# Patient Record
(demographics unavailable — no encounter records)

---

## 2025-01-23 NOTE — PHYSICAL EXAM
[Respiratory Effort] : normal respiratory effort [Normal Heart Sounds] : normal heart sounds [Alert] : alert [Calm] : calm [2+] : left 2+ [Ankle Swelling (On Exam)] : not present [Varicose Veins Of Lower Extremities] : not present [] : not present [Abdomen Tenderness] : ~T ~M No abdominal tenderness [de-identified] : WN/WD, NAD [de-identified] : NC/AT [de-identified] : supple [de-identified] : FROM [de-identified] : grossly intact

## 2025-01-23 NOTE — REVIEW OF SYSTEMS
[Limb Swelling] : limb swelling [Negative] : Heme/Lymph [As Noted in HPI] : as noted in HPI [Limb Pain] : no limb pain

## 2025-01-23 NOTE — PHYSICAL EXAM
[Respiratory Effort] : normal respiratory effort [Normal Heart Sounds] : normal heart sounds [Alert] : alert [Calm] : calm [2+] : left 2+ [Ankle Swelling (On Exam)] : not present [Varicose Veins Of Lower Extremities] : not present [] : not present [Abdomen Tenderness] : ~T ~M No abdominal tenderness [de-identified] : WN/WD, NAD [de-identified] : NC/AT [de-identified] : supple [de-identified] : FROM [de-identified] : grossly intact

## 2025-01-23 NOTE — ADDENDUM
[FreeTextEntry1] : This note was written by Wen GARIBAY, acting as a scribe for Dr. Coleman Toscano.  I, Dr. Coleman Toscano, have read and attest that all the information, medical decision-making, and discharge instructions within are true and accurate.  I, Dr. Coleman Toscano, personally performed the evaluation and management (E/M) services for this new patient.  That E/M includes conducting the initial examination, assessing all conditions, and establishing the plan of care.  Today, my ACP, Wen GARIBAY, was here to observe my evaluation and management services for this patient to be followed going forward.  I spent a total of 40 minutes in this encounter.

## 2025-01-23 NOTE — HISTORY OF PRESENT ILLNESS
[FreeTextEntry1] : 77yoF with HTN, hypothyroidism who is referred by her podiatrist, Dr. Anjana Polanco to be evaluated for LEs edema. Patient states that her podiatrist noticed her legs were swollen every time she had an appointment. Patient hasn't noticed it herself, since she hasn't experienced any discomfort or difficulties wearing shoes. Once her podiatrist mentioned it to her, she started to do research and realized thet her swelling is might be due Amlodipine, that she has been taking for few years. She stopped it and noticed that her swelling has subsided. She denies hx of varicose veins, DVT, SVT, claudication, rest pain, skin changes.

## 2025-01-23 NOTE — ASSESSMENT
[FreeTextEntry1] : 77yoF with HTN, hypothyroidism who is referred by her podiatrist, Dr. Anjana Polanco to be evaluated for LEs edema. Patient denies LE pain, hx of varicose veins/DVT. She was on Amlodipine and discontinued it Her swelling has improved. On exam, both legs are well perfused, no evidence of edema, skin discoloration. Palpable peripheral pulses. Venous doppler was done in the office that demonstrated no evidence of DVT/SVT, no reflux. We discussed the findings and explained that no vascular issues at this time. She may f/u as needed.

## 2025-01-23 NOTE — PROCEDURE
[FreeTextEntry1] : Venous doppler was done in the office that demonstrated no evidence of DVT/SVT, no reflux

## 2025-02-24 NOTE — PROCEDURE
[de-identified] : INJECTION RIGHT AND LEFT SHOULDER GH JOINT   Patient has demonstrated limited relief from NSAIDS, rest, exercises / PT, and after discussion of the risks and benefits, the patient has elected to proceed with an ULTRASOUND GUIDED injection into the RIGHT AND LEFT  GLENOHUMERAL JOINT - POSTERIOR APPROACH    Confirmed that the patient does not have history of prior adverse reactions, active, infections, or relevant allergies. There was no effusion, erythema, or warmth, and the skin was clear  The skin was sterilized with alcohol. Ethyl Chloride was used as a topical anesthetic. Routine sterile technique.  The site was injected UTILIZING ULTRASOUND GUIDANCE to confirm appropriate placement of the needle- with a mixture of medication and local anesthetic. The injection was completed without complication and a bandage was applied.   The patient tolerated the procedure well and was given post-injection instructions.Rec: Cold therapy, analgesics, avoid heavy activity. MEDICATION: 4cc of 1% xylocaine + 10mg of Kenalog LOT# [id number] EXP MARCH 2026

## 2025-02-24 NOTE — PHYSICAL EXAM
[de-identified] : PHYSICAL EXAM LEFT AND RIGHT   SHOULDER  NORMAL POSTURE / SCAPULAR PROTRACTION AROM  LEFT  140 / 140 / 75 / 10 RIGHT   150 / 140 / 80 / 10 TENDER:  GH JOINT POSTERIOR   SPECIAL TESTING : NAYAK - POSITIVE  JASE - POSITIVE  SPEED TEST - POSITIVE  HOWARD - NEGATIVE  APPREHENSION AND SUPPRESSION - NEGATIVE   RC STRENGTH TESTING  SS:  5/5 SUB 5/5 IS     5/5 BICEPS  5/5  SENSATION  - GROSSLY INTACT    [de-identified] : LEFT SHOULDER XRAY (2 VIEWS - AP AND OUTLET) -   NO OBVIOUS FRACTURE , SEPARATION OR DISLOCATION  GRADE 2 GH OSTEOARTHRITIS  TYPE 2B ACROMION     RIGHT SHOULDER XRAY (2 VIEWS - AP AND OUTLET) -   NO OBVIOUS FRACTURE ,  SEPARATION OR DISLOCATION  GRADE 3 GH OSTEOARTHRITIS  TYPE 2B ACROMION

## 2025-02-24 NOTE — DISCUSSION/SUMMARY
[de-identified] : SHOULDER GH OSTEOARTHRITIS   PATIENT HAS ELECTED TO PROCEED WITH KENALOG INJECTION SHOULDER RISKS AND BENEFITS DISCUSSED - VERBAL CONSENT OBTAINED SEE PROCEDURE NOTE     POST INJECTION INSTRUCTIONS:   INJECTION THERAPY HANDOUT PROVIDED   COLD THERAPY , TYLENOL  PRN   HOME  EXERCISES QD -  PENDULUM AND ROM  HANDOUT PROVIDED, REVIEWED AND DEMONSTRATED - REFERRED TO INSTRUCTIONAL VIDEO ON MY WEBSITE    MONOVISC ORDERED

## 2025-02-24 NOTE — HISTORY OF PRESENT ILLNESS
[de-identified] : PATIENT PRESENTS TODAY WITH BILATERAL SHOULDER (R>L)- RHD  PAIN BEGAN 6 MONTHS AGO - NO SPECIFIC INJURY RADIATES UP NECK IS YOUR PROBLEM GETTING WORSE? YES INTERMITTENT PAIN   5-6 /10 DEEP IN SHOUDLER DESCRIPTION OF PAIN: SHARP, ACHY, THROBBING PRIOR/CURRENT TREATMENTS: RESTING, TYLENOL, ADVIL  WORSE AT PARTICULAR TIME OF DAY: WORSE AT NIGHT, DURING THE DAY, SLEEPING AGGRAVATING FACTORS: GRABING, PICKING UP, OVER HEADLIFTING, REACHING BEHIND,   HAS HAD PHYSICAL THERAPY WITHOUT RELIEF- NO HAS HAD PREVIOUS SURGERY- NO HAS HAD PREVIOUS INJECTION - NO HAS HAD PREVIOUS IMAGING - NO

## 2025-03-24 NOTE — DISCUSSION/SUMMARY
[de-identified] :  PATIENT HAS ELECTED TO PROCEED WITH MONOVISC INJECTION SHOULDER RISKS AND BENEFITS DISCUSSED - VERBAL CONSENT OBTAINED SEE PROCEDURE NOTE     POST INJECTION INSTRUCTIONS:   INJECTION THERAPY HANDOUT PROVIDED   COLD THERAPY , ANALGESICS PRN   HOME  EXERCISES QD -    REPEAT MONOVISC 6 MONTHS

## 2025-03-24 NOTE — HISTORY OF PRESENT ILLNESS
[de-identified] : PATIENT IS HERE FOR TODAY FOR  RIGHT SHOULDER/ LEFT SHOULDER FOLLOW UP GEL INJECTION TODAY       PREVIOUS HPI-PATIENT PRESENTS TODAY WITH BILATERAL SHOULDER (R>L)- RHD  PAIN BEGAN 6 MONTHS AGO - NO SPECIFIC INJURY RADIATES UP NECK IS YOUR PROBLEM GETTING WORSE? YES INTERMITTENT PAIN   5-6 /10 DEEP IN SHOUDLER DESCRIPTION OF PAIN: SHARP, ACHY, THROBBING PRIOR/CURRENT TREATMENTS: RESTING, TYLENOL, ADVIL  WORSE AT PARTICULAR TIME OF DAY: WORSE AT NIGHT, DURING THE DAY, SLEEPING AGGRAVATING FACTORS: GRABING, PICKING UP, OVER HEADLIFTING, REACHING BEHIND,   HAS HAD PHYSICAL THERAPY WITHOUT RELIEF- NO HAS HAD PREVIOUS SURGERY- NO HAS HAD PREVIOUS INJECTION - NO HAS HAD PREVIOUS IMAGING - NO

## 2025-03-24 NOTE — PROCEDURE
[de-identified] : INJECTION VISCOSUPPLEMENT RIGHT AND LEFT   SHOULDER GH JOINT  Patient has demonstrated limited relief from NSAIDS, rest, exercises / PT, and after discussion of the risks and benefits, the patient has elected to proceed with an ULTRASOUND GUIDED injection into the RIGHT AND LEFT SHOULDER GH JOINT    Confirmed that the patient does not have history of prior adverse reactions, active, infections, or relevant allergies. There was no effusion, erythema, or warmth, and the skin was clear  The skin was sterilized with alcohol. Ethyl Chloride was used as a topical anesthetic. Routine sterile technique.  The site was injected UTILIZING ULTRASOUND GUIDANCE to confirm appropriate placement of the needle- with VISCOSUPPLEMENT. The injection was completed without complication and a bandage was applied.   The patient tolerated the procedure well and was given post-injection instructions.Rec: Cold therapy, analgesics, avoid heavy activity. MEDICATION: MONOVISC 4cc BECKY EDWARDS

## 2025-03-24 NOTE — DISCUSSION/SUMMARY
[de-identified] :  PATIENT HAS ELECTED TO PROCEED WITH MONOVISC INJECTION SHOULDER RISKS AND BENEFITS DISCUSSED - VERBAL CONSENT OBTAINED SEE PROCEDURE NOTE     POST INJECTION INSTRUCTIONS:   INJECTION THERAPY HANDOUT PROVIDED   COLD THERAPY , ANALGESICS PRN   HOME  EXERCISES QD -    REPEAT MONOVISC 6 MONTHS

## 2025-03-24 NOTE — HISTORY OF PRESENT ILLNESS
[de-identified] : PATIENT IS HERE FOR TODAY FOR  RIGHT SHOULDER/ LEFT SHOULDER FOLLOW UP GEL INJECTION TODAY       PREVIOUS HPI-PATIENT PRESENTS TODAY WITH BILATERAL SHOULDER (R>L)- RHD  PAIN BEGAN 6 MONTHS AGO - NO SPECIFIC INJURY RADIATES UP NECK IS YOUR PROBLEM GETTING WORSE? YES INTERMITTENT PAIN   5-6 /10 DEEP IN SHOUDLER DESCRIPTION OF PAIN: SHARP, ACHY, THROBBING PRIOR/CURRENT TREATMENTS: RESTING, TYLENOL, ADVIL  WORSE AT PARTICULAR TIME OF DAY: WORSE AT NIGHT, DURING THE DAY, SLEEPING AGGRAVATING FACTORS: GRABING, PICKING UP, OVER HEADLIFTING, REACHING BEHIND,   HAS HAD PHYSICAL THERAPY WITHOUT RELIEF- NO HAS HAD PREVIOUS SURGERY- NO HAS HAD PREVIOUS INJECTION - NO HAS HAD PREVIOUS IMAGING - NO

## 2025-03-24 NOTE — PROCEDURE
[de-identified] : INJECTION VISCOSUPPLEMENT RIGHT AND LEFT   SHOULDER GH JOINT  Patient has demonstrated limited relief from NSAIDS, rest, exercises / PT, and after discussion of the risks and benefits, the patient has elected to proceed with an ULTRASOUND GUIDED injection into the RIGHT AND LEFT SHOULDER GH JOINT    Confirmed that the patient does not have history of prior adverse reactions, active, infections, or relevant allergies. There was no effusion, erythema, or warmth, and the skin was clear  The skin was sterilized with alcohol. Ethyl Chloride was used as a topical anesthetic. Routine sterile technique.  The site was injected UTILIZING ULTRASOUND GUIDANCE to confirm appropriate placement of the needle- with VISCOSUPPLEMENT. The injection was completed without complication and a bandage was applied.   The patient tolerated the procedure well and was given post-injection instructions.Rec: Cold therapy, analgesics, avoid heavy activity. MEDICATION: MONOVISC 4cc BECKY EDWARDS

## 2025-03-24 NOTE — PHYSICAL EXAM
[de-identified] : PHYSICAL EXAM LEFT AND RIGHT   SHOULDER  NORMAL POSTURE / SCAPULAR PROTRACTION AROM  LEFT  140 / 140 / 75 / 10 RIGHT   150 / 140 / 80 / 10 TENDER:  GH JOINT POSTERIOR   SPECIAL TESTING : NAYAK - POSITIVE  JASE - POSITIVE  SPEED TEST - POSITIVE  HOWARD - NEGATIVE  APPREHENSION AND SUPPRESSION - NEGATIVE   RC STRENGTH TESTING  SS:  5/5 SUB 5/5 IS     5/5 BICEPS  5/5  SENSATION  - GROSSLY INTACT

## 2025-03-24 NOTE — PHYSICAL EXAM
[de-identified] : PHYSICAL EXAM LEFT AND RIGHT   SHOULDER  NORMAL POSTURE / SCAPULAR PROTRACTION AROM  LEFT  140 / 140 / 75 / 10 RIGHT   150 / 140 / 80 / 10 TENDER:  GH JOINT POSTERIOR   SPECIAL TESTING : NAYAK - POSITIVE  JASE - POSITIVE  SPEED TEST - POSITIVE  HOWARD - NEGATIVE  APPREHENSION AND SUPPRESSION - NEGATIVE   RC STRENGTH TESTING  SS:  5/5 SUB 5/5 IS     5/5 BICEPS  5/5  SENSATION  - GROSSLY INTACT

## 2025-05-01 NOTE — ASSESSMENT
[FreeTextEntry1] : My impression is that the patient has significant right greater than left carpal tunnel syndrome.  She had an EMG done in 2019 demonstrating denervation as well as C5-C6 radiculopathy.  There is weakness of abductor pollicis brevis on the right.  I recommend she undergo right endoscopic versus open right carpal tunnel decompression  The risks benefits and alternatives were discussed with the patient including, but not limited to:   infection, nerve injury, pillar pain, CRPS, anesthetic block injury, persistent symptoms, scar sensitivity, development of a trigger digit, recurrence, incomplete release, etc.  The patient would like to proceed with surgery.  Shared decision-making was undertaken

## 2025-05-01 NOTE — PHYSICAL EXAM
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  She has 4-5 strength of her abductor pollicis brevis on the right.  Full strength of her abductor pollicis brevis on the left.  No palpable trigger digits.  Well-healed right thumb trigger finger [de-identified] : PA lateral oblique x-rays as well as carpal tunnel views do not show any acute osseous abnormality.  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

## 2025-05-01 NOTE — HISTORY OF PRESENT ILLNESS
[Right] : right hand dominant [FreeTextEntry1] : Patient here for bilateral right greater than left carpal tunnel syndrome which started 6 months ago.  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 Patient had an EMG test done in 2019 which demonstrated acute denervation bilateral carpal tunnel syndrome.  Patient has not treated her symptoms medically.  She is status post trigger thumb decompression certainly on the right-hand side